# Patient Record
Sex: FEMALE | Race: OTHER | HISPANIC OR LATINO | URBAN - METROPOLITAN AREA
[De-identification: names, ages, dates, MRNs, and addresses within clinical notes are randomized per-mention and may not be internally consistent; named-entity substitution may affect disease eponyms.]

---

## 2022-04-25 ENCOUNTER — EMERGENCY (EMERGENCY)
Facility: HOSPITAL | Age: 57
LOS: 1 days | Discharge: HOME | End: 2022-04-25
Attending: STUDENT IN AN ORGANIZED HEALTH CARE EDUCATION/TRAINING PROGRAM
Payer: MEDICAID

## 2022-04-25 VITALS
HEART RATE: 75 BPM | RESPIRATION RATE: 18 BRPM | OXYGEN SATURATION: 97 % | TEMPERATURE: 98 F | DIASTOLIC BLOOD PRESSURE: 79 MMHG | SYSTOLIC BLOOD PRESSURE: 151 MMHG

## 2022-04-25 VITALS
WEIGHT: 159.84 LBS | HEART RATE: 82 BPM | DIASTOLIC BLOOD PRESSURE: 92 MMHG | OXYGEN SATURATION: 98 % | TEMPERATURE: 98 F | SYSTOLIC BLOOD PRESSURE: 172 MMHG | HEIGHT: 62 IN | RESPIRATION RATE: 18 BRPM

## 2022-04-25 LAB — HCG UR QL: NEGATIVE — SIGNIFICANT CHANGE UP

## 2022-04-25 PROCEDURE — 72128 CT CHEST SPINE W/O DYE: CPT | Mod: 26,MA

## 2022-04-25 PROCEDURE — 72131 CT LUMBAR SPINE W/O DYE: CPT | Mod: 26,MA

## 2022-04-25 PROCEDURE — 73080 X-RAY EXAM OF ELBOW: CPT | Mod: 26,LT

## 2022-04-25 PROCEDURE — 70450 CT HEAD/BRAIN W/O DYE: CPT | Mod: 26,MA

## 2022-04-25 PROCEDURE — 72128 CT CHEST SPINE W/O DYE: CPT | Mod: MA

## 2022-04-25 PROCEDURE — 72192 CT PELVIS W/O DYE: CPT | Mod: 26,MA

## 2022-04-25 PROCEDURE — 99285 EMERGENCY DEPT VISIT HI MDM: CPT

## 2022-04-25 PROCEDURE — 99284 EMERGENCY DEPT VISIT MOD MDM: CPT | Mod: 25

## 2022-04-25 PROCEDURE — 70450 CT HEAD/BRAIN W/O DYE: CPT | Mod: MA

## 2022-04-25 PROCEDURE — 72131 CT LUMBAR SPINE W/O DYE: CPT | Mod: MA

## 2022-04-25 PROCEDURE — 81025 URINE PREGNANCY TEST: CPT

## 2022-04-25 PROCEDURE — 72192 CT PELVIS W/O DYE: CPT | Mod: MA

## 2022-04-25 PROCEDURE — 73080 X-RAY EXAM OF ELBOW: CPT

## 2022-04-25 RX ORDER — OXYCODONE HYDROCHLORIDE 5 MG/1
1 TABLET ORAL
Qty: 16 | Refills: 0
Start: 2022-04-25 | End: 2022-04-28

## 2022-04-25 RX ORDER — OXYCODONE HYDROCHLORIDE 5 MG/1
5 TABLET ORAL ONCE
Refills: 0 | Status: DISCONTINUED | OUTPATIENT
Start: 2022-04-25 | End: 2022-04-25

## 2022-04-25 RX ORDER — OXYCODONE HYDROCHLORIDE 5 MG/1
5 TABLET ORAL ONCE
Refills: 0 | Status: COMPLETED | OUTPATIENT
Start: 2022-04-25 | End: 2022-04-25

## 2022-04-25 RX ORDER — ACETAMINOPHEN 500 MG
650 TABLET ORAL ONCE
Refills: 0 | Status: COMPLETED | OUTPATIENT
Start: 2022-04-25 | End: 2022-04-25

## 2022-04-25 RX ADMIN — OXYCODONE HYDROCHLORIDE 5 MILLIGRAM(S): 5 TABLET ORAL at 22:01

## 2022-04-25 RX ADMIN — Medication 650 MILLIGRAM(S): at 22:01

## 2022-04-25 NOTE — ED PROVIDER NOTE - PROGRESS NOTE DETAILS
Pt feeling well, ambulating without pain or limitation in ER, denies any numbness or weakness, no deficits on exam. Will f/u with spine. Strict return precautions given.

## 2022-04-25 NOTE — ED PROVIDER NOTE - OBJECTIVE STATEMENT
Patient is a 57 year old female with no significant PHMx presenting to the ED with complaints of back pain prior to fall. Patient states she slipped and fell 8 steps down a ladder, but caught herself on her way down with her left arm, which broke her fall. Patient additionally states she landed on her lower back, and hit the back of her head. Patient reports experiencing pain on her lower back but otherwise denies any loss of consciousness. NKDA. Patient is a 57 year old female with no significant PHMx presenting to the ED with complaints of back pain s/p fall. Patient states she slipped and fell 8 steps down a ladder, but caught herself on her way down with her left arm, which partly broke her fall. Patient then landed on her lower back and hit the back of her head but no LOC. NKDA.

## 2022-04-25 NOTE — ED PROVIDER NOTE - NSFOLLOWUPINSTRUCTIONS_ED_ALL_ED_FT
Follow up with your PCP in 24-48 hours.   Call a spine specialist to make a follow up appointment within 1 week.   May take oxycodone 5 mg every 6 hours as needed for severe pain.   May take Tylenol and Motrin as directed on the bottle for pain control.   Return to the ER if you develop any new or worsening symptoms such as worsening back pain, numbness, weakness, incontinence.

## 2022-04-25 NOTE — ED ADULT NURSE NOTE - NSIMPLEMENTINTERV_GEN_ALL_ED
Implemented All Fall Risk Interventions:  [place] to call system. Call bell, personal items and telephone within reach. Instruct patient to call for assistance. Room bathroom lighting operational. Non-slip footwear when patient is off stretcher. Physically safe environment: no spills, clutter or unnecessary equipment. Stretcher in lowest position, wheels locked, appropriate side rails in place. Provide visual cue, wrist band, yellow gown, etc. Monitor gait and stability. Monitor for mental status changes and reorient to person, place, and time. Review medications for side effects contributing to fall risk. Reinforce activity limits and safety measures with patient and family.

## 2022-04-25 NOTE — ED PROVIDER NOTE - MUSCULOSKELETAL, MLM
Spine appears normal. Full range of motion for all 4 extremities without pain or limitations. No CVA tenderness. Tenderness to left elbow. Tenderness to midline lumbar tenderness. No hip tenderness. No soft tissue swelling.

## 2022-04-25 NOTE — ED PROVIDER NOTE - PATIENT PORTAL LINK FT
You can access the FollowMyHealth Patient Portal offered by [facility] by registering at the following website: [email or web address]. By joining [employer]’s FollowMyHealth portal, you will also be able to view your health information using other applications (apps) compatible with our system.

## 2022-04-25 NOTE — ED PROVIDER NOTE - NS_EDPROVIDERDISPOUSERTYPE_ED_A_ED
Scribe Attestation (For Scribes USE Only)... 07-Jan-2018 13:28 Attending Attestation (For Attendings USE Only).../Scribe Attestation (For Scribes USE Only)...

## 2022-04-25 NOTE — ED ADULT NURSE NOTE - OBJECTIVE STATEMENT
Patient presented to the ED with S/P fall c/o hitting her head, pain lower back, buttocks and left arm.

## 2022-04-25 NOTE — ED PROVIDER NOTE - SKIN, MLM
Skin normal color for race, warm, dry and intact. No evidence of rash. Mild bruising to bilateral buttock.

## 2022-04-25 NOTE — ED PROVIDER NOTE - CLINICAL SUMMARY MEDICAL DECISION MAKING FREE TEXT BOX
Patient is a 57 year old female with no significant PHMx presenting to the ED with complaints of back pain prior to fall. Exam and history concerning for facture. Will reassess further for CT. Low suspicion for abdominal trauma based on exam and history. Will obtain pain meds and reassesses. Back pain, L elbow pain s/p fall off ladder. Did not fall 8 feet as in triage note. Broke fall half way down by grabbing ladder with left arm. Will assess for vertebral fracture with CT. Pt well appearing, sitting upright in chair, HD stable, low suspicion for serious thoracic or abdominal trauma based on exam and history.

## 2022-04-25 NOTE — ED ADULT TRIAGE NOTE - BP NONINVASIVE SYSTOLIC (MM HG)
Most likely benign and self resolving   Patient denies vision changes, conjunctival erythema, eye discharge, or headaches   Advised to continue monitoring symptoms    172

## 2022-04-25 NOTE — ED ADULT TRIAGE NOTE - CHIEF COMPLAINT QUOTE
sp fall off ladder about 10steps hit head, c/o pain lower back, buttocks and left arm. denied loss of consciousness, no nausea/vomiting.

## 2022-04-28 PROBLEM — Z00.00 ENCOUNTER FOR PREVENTIVE HEALTH EXAMINATION: Status: ACTIVE | Noted: 2022-04-28

## 2022-04-29 ENCOUNTER — APPOINTMENT (OUTPATIENT)
Dept: ORTHOPEDIC SURGERY | Facility: CLINIC | Age: 57
End: 2022-04-29
Payer: MEDICAID

## 2022-04-29 VITALS
BODY MASS INDEX: 29.06 KG/M2 | HEART RATE: 67 BPM | WEIGHT: 148 LBS | HEIGHT: 60 IN | OXYGEN SATURATION: 96 % | SYSTOLIC BLOOD PRESSURE: 115 MMHG | DIASTOLIC BLOOD PRESSURE: 72 MMHG

## 2022-04-29 DIAGNOSIS — S32.009A UNSPECIFIED FRACTURE OF UNSPECIFIED LUMBAR VERTEBRA, INITIAL ENCOUNTER FOR CLOSED FRACTURE: ICD-10-CM

## 2022-04-29 DIAGNOSIS — S22.009A UNSPECIFIED FRACTURE OF UNSPECIFIED THORACIC VERTEBRA, INITIAL ENCOUNTER FOR CLOSED FRACTURE: ICD-10-CM

## 2022-04-29 PROCEDURE — 99203 OFFICE O/P NEW LOW 30 MIN: CPT

## 2022-04-29 NOTE — HISTORY OF PRESENT ILLNESS
[de-identified] : Ms. MALLORY MORRIS  is a 57 year old female who presents with lower thoracic and left lumbar pain since she fell on Monday and landed on a ladder.  She went to the ER and had studies done.  She is slowly feeling better.  She is taking oxycodone for pain control.  Denies any LE radicular symptoms.  Normal bowel and bladder control.   Denies any recent fevers, chills, sweats, weight loss, or infection.\par \par The patients past medical history, past surgical history, medications, allergies, and social history were reviewed by me today with the patient and documented accordingly.  In addition, the patient's family history, which is noncontributory to their visit, was also reviewed.\par

## 2022-04-29 NOTE — DISCUSSION/SUMMARY
[de-identified] : We discussed further treatment options.  Overall she is slowly improving.  Restrictions again were reviewed.  She will continue with over-the-counter medications.  Follow-up afterwards or sooner with any changes or worsening of her symptoms.

## 2022-04-29 NOTE — PHYSICAL EXAM
[Antalgic] : not antalgic [Ataxic] : not ataxic [de-identified] : Examination of the thoracic and lumbar spine reveals no midline tenderness palpation, step-offs, or skin lesions. Decreased range of motion with respect to flexion, extension, lateral bending, and rotation. No tenderness to palpation of the sciatic notch. No tenderness palpation of the bilateral greater trochanters. No pain with passive internal/external rotation of the hips. No instability of bilateral lower extremities.  Negative MONIQUE. Negative straight leg raise bilaterally. No bowstring. Negative femoral stretch. 5 out of 5 iliopsoas, hip abductors, hips adductors, quadriceps, hamstrings, gastrocsoleus, tibialis anterior, extensor hallucis longus, peroneals. Grossly intact sensation to light touch bilateral lower extremities. 1+ patellar and Achilles reflexes. Downgoing Babinski. No clonus. Intact proprioception. Palpable pulses. No skin lesion and no edema on the right and left lower extremities. [de-identified] : Review of her CT scans of her thoracic and lumbar spine does reveal questionable T4 compression fracture.  Minimally displaced left L1 and L2 transverse process fractures.

## 2022-11-16 ENCOUNTER — EMERGENCY (EMERGENCY)
Facility: HOSPITAL | Age: 57
LOS: 1 days | Discharge: HOME | End: 2022-11-16
Attending: STUDENT IN AN ORGANIZED HEALTH CARE EDUCATION/TRAINING PROGRAM
Payer: MEDICAID

## 2022-11-16 VITALS
OXYGEN SATURATION: 95 % | DIASTOLIC BLOOD PRESSURE: 76 MMHG | HEART RATE: 64 BPM | WEIGHT: 145.06 LBS | RESPIRATION RATE: 18 BRPM | TEMPERATURE: 98 F | SYSTOLIC BLOOD PRESSURE: 148 MMHG

## 2022-11-16 PROCEDURE — 99283 EMERGENCY DEPT VISIT LOW MDM: CPT | Mod: 25

## 2022-11-16 PROCEDURE — 96372 THER/PROPH/DIAG INJ SC/IM: CPT

## 2022-11-16 PROCEDURE — 99284 EMERGENCY DEPT VISIT MOD MDM: CPT

## 2022-11-16 RX ORDER — IBUPROFEN 200 MG
1 TABLET ORAL
Qty: 20 | Refills: 0
Start: 2022-11-16 | End: 2022-11-20

## 2022-11-16 RX ORDER — METHOCARBAMOL 500 MG/1
2 TABLET, FILM COATED ORAL
Qty: 18 | Refills: 0
Start: 2022-11-16 | End: 2022-11-18

## 2022-11-16 RX ORDER — KETOROLAC TROMETHAMINE 30 MG/ML
30 SYRINGE (ML) INJECTION ONCE
Refills: 0 | Status: DISCONTINUED | OUTPATIENT
Start: 2022-11-16 | End: 2022-11-16

## 2022-11-16 RX ORDER — METHOCARBAMOL 500 MG/1
1500 TABLET, FILM COATED ORAL ONCE
Refills: 0 | Status: COMPLETED | OUTPATIENT
Start: 2022-11-16 | End: 2022-11-16

## 2022-11-16 RX ORDER — LIDOCAINE 4 G/100G
1 CREAM TOPICAL
Qty: 7 | Refills: 0
Start: 2022-11-16 | End: 2022-11-22

## 2022-11-16 RX ORDER — LIDOCAINE 4 G/100G
1 CREAM TOPICAL ONCE
Refills: 0 | Status: COMPLETED | OUTPATIENT
Start: 2022-11-16 | End: 2022-11-16

## 2022-11-16 RX ADMIN — METHOCARBAMOL 1500 MILLIGRAM(S): 500 TABLET, FILM COATED ORAL at 12:54

## 2022-11-16 RX ADMIN — LIDOCAINE 1 PATCH: 4 CREAM TOPICAL at 12:55

## 2022-11-16 RX ADMIN — Medication 30 MILLIGRAM(S): at 12:54

## 2022-11-16 NOTE — ED PROVIDER NOTE - CLINICAL SUMMARY MEDICAL DECISION MAKING FREE TEXT BOX
57 year old female with past medical history of hypertension presents to the ED with complaints of right-sided lower back pain radiating down to the right leg for 2 days. No concerning features. Will give the patient pain medications and reassess.

## 2022-11-16 NOTE — ED PROVIDER NOTE - PATIENT PORTAL LINK FT
You can access the FollowMyHealth Patient Portal offered by [facility] by registering at the following website: [email or web address]. By joining [employer]’s FollowMyHealth portal, you will also be able to view your health information using other applications (apps) compatible with our system.

## 2022-11-16 NOTE — ED PROVIDER NOTE - CHPI ED SYMPTOMS NEG
no weakness, no saddle anesthesia, no fever, no chills,/no bladder dysfunction/no bowel dysfunction/no numbness/no tingling

## 2022-11-16 NOTE — ED PROVIDER NOTE - NSFOLLOWUPINSTRUCTIONS_ED_ALL_ED_FT
Tamy un seguimiento con lawson médico de atención primaria dentro de 1 semana.    Medicamentos enviados a la farmacia por usted.    Si experimenta síntomas nuevos o que empeoran, o si está preocupado, siempre puede regresar a la emergencia para ishan reevaluación.    Follow up with your primary care doctor within 1 week.     Medications sent to the pharmacy for you.     If you experience any new or worsening symptoms or if you are concerned you can always come back to the emergency for a re-evaluation.     Dolor de espalda    El dolor de espalda puede debbie miedo. Juliane incluso cuando el dolor es intenso, por lo general desaparece por sí solo en unas pocas semanas. Los casos que requieren atención urgente o cirugía son raros. No asumas lo peor. Ashley todo el chris tiene dolor de espalda en algún momento.    Consulte a lawson médico o enfermera si tiene dolor de espalda y usted:    -Recientemente tuvo ishan caída o ishan lesión en la espalda    -Tiene entumecimiento o debilidad en las piernas    -Tiene problemas con el control de la vejiga o el intestino    -Tiene pérdida de peso inexplicable    -Tener fiebre o sentirse enfermo de otras maneras    -Dorothy medicamentos esteroides, leora prednisona, de forma regular.    -Tiene diabetes o un problema médico que debilita lawson sistema inmunológico    -Tener antecedentes de cáncer u osteoporosis.    También debe consultar a un médico si:    -Lawson dolor de espalda es tan severo que no puede realizar tareas simples    -Lawson dolor de espalda no comienza a mejorar en 4 semanas    Muchas cosas diferentes pueden causar dolor lumbar. La mayoría de las veces, los médicos no conocen la causa exacta.    El dolor de espalda puede ocurrir si se esfuerza un músculo. [place] es a menudo lo que estrada sucedido cuando ishan persona "se cinda" la espalda. [place] se refiere al dolor que comienza repentinamente después de la actividad física, leora levantar algo pesado o doblar la espalda.    El dolor de espalda también puede ocurrir si tiene:    -Discos dañados, abultados o rotos    -Artritis que afecta las articulaciones de la columna vertebral    - Crecimientos óseos en las vértebras que aprietan los nervios cercanos    -Ishan vértebra fuera de lugar    -Estrechamiento en el canal ugalde    -Un tumor o infección (juliane esto es muy raro)    La mayoría de las personas con un episodio de dolor lumbar no tienen un problema médico grave y pueden probar tratamientos simples leora:    -Mantenerse activo: lo mejor que puede hacer es mantenerse lo más activo posible. Las personas con dolor lumbar se recuperan más rápido si se mantienen activas. Si lawson dolor es intenso, es posible que deba descansar skylar cirilo o dos días. Juliane es importante volver a caminar y moverse lo antes posible. Si crystal debe evitar el levantamiento de objetos pesados ??y los deportes mientras le duele la espalda, intente continuar con jabier actividades diarias normales.    -Calor: a algunas personas les resulta útil usar ishan almohadilla térmica o ishan envoltura térmica. Tenga cuidado de evitar los ajustes de calor alto para evitar quemaduras en la piel.    -Medicamentos: brooke, puede probar analgésicos que puede obtener sin receta médica. En muchos casos, los médicos sugieren probar brooke un medicamento antiinflamatorio no esteroideo o "CARLOS EDUARDO". Los CARLOS EDUARDO incluyen ibuprofeno (marcas de muestra: Advil, Motrin) y naproxeno (marcas de muestra: Aleve). Estos podrían funcionar mejor que el paracetamol (Tylenol) para el dolor de espalda.    -Tratamientos para ayudar con los síntomas: algunos tratamientos pueden ayudarlo a sentirse mejor por un tiempo. Incluyen:    -Manipulación de la columna: esto es cuando un quiropráctico, fisioterapeuta u otro profesional mueve o "ajusta" las articulaciones de la espalda. Si quiere probar esto, hable brooke con lawson médico o enfermera.    -Acupuntura: esto es cuando alguien que conoce la medicina tradicional china inserta pequeñas agujas en lawson cuerpo para bloquear las señales de dolor.    -Masaje    Si crystal el dolor de espalda generalmente desaparece en unas pocas semanas, algunas personas continúan teniendo dolor por más tiempo. En nate abraham, los tratamientos adicionales pueden incluir:    -Cuidado propio: esto implica ser consciente de lawson dolor. Si crystal debe descansar cuando lo necesite, es importante mantenerse activo tanto leora pueda. Cosas leora aplicar calor y hacer estiramientos suaves también pueden ayudarlo a sentirse mejor.    -Fisioterapia: un fisioterapeuta es un experto en ejercicios que puede enseñarle estiramientos y movimientos para ayudarlo a fortalecer jabier músculos. El objetivo es aliviar el dolor juliane también ayudarlo a volver a jabier actividades normales. Los ejercicios que puede probar incluyen caminar, nadar o usar ishan bicicleta estática. Algunas personas también encuentran que el [place] o el yoga pueden ayudar con el dolor de espalda. Encontrar actividades que disfrute puede ayudarlo a mantenerse activo.    -Reducir el estrés: a algunas personas les resulta útil probar algo llamado "reducción del estrés basada en la atención plena". [place] implica asistir a un programa grupal para practicar la relajación y la meditación. Si lawson dolor de espalda lo hace sentir ansioso o deprimido, hable con lawson médico o enfermera. Existen otros tratamientos que pueden ayudar con estos problemas.

## 2022-11-16 NOTE — ED PROVIDER NOTE - OBJECTIVE STATEMENT
57 year old female with past medical history of hypertension presents to the ED with complaints of right-sided lower back pain radiating down to the right leg for 2 days. The patient additionally reports headache, generalized fatigue. She reports that her symptoms started after cleaning her yard. The patient denies heavy lifting but does report bending over frequently. Denies LE numbness, tingling, weakness, saddle anesthesia, fever, chills, IVDU, use of medications for her pain, hx of cancer, bowel or bladder incontinence or any other concerning features. NKDA.

## 2022-11-16 NOTE — ED ADULT NURSE NOTE - OBJECTIVE STATEMENT
58 yo female sitting on a chair c/o lower back pain radiating to right leg that started 2 days ago. Patient endorses headache and fatigue.

## 2022-11-16 NOTE — ED PROVIDER NOTE - NS_ACPWITHSCRIBE_ED_ALL_ED
General I personally performed the service described in the documentation  recorded by the scribe in my presence, and it accurately and completely records my words and action.

## 2022-11-16 NOTE — ED PROVIDER NOTE - PROGRESS NOTE DETAILS
Patient reports she is feeling better. Will have patient follow up with PCP. Pt is well appearing walking with steady gait, stable for discharge and follow up without fail with medical doctor. I had a detailed discussion with the patient and/or guardian regarding the historical points, exam findings, and any diagnostic results supporting the discharge diagnosis. Pt educated on care and need for follow up. Strict return instructions and red flag signs and symptoms discussed with patient. Questions answered. Pt shows understanding of discharge information and agrees to follow.

## 2022-11-18 ENCOUNTER — EMERGENCY (EMERGENCY)
Facility: HOSPITAL | Age: 57
LOS: 1 days | Discharge: HOME | End: 2022-11-18
Attending: EMERGENCY MEDICINE
Payer: MEDICAID

## 2022-11-18 VITALS
TEMPERATURE: 98 F | HEIGHT: 60.63 IN | RESPIRATION RATE: 16 BRPM | SYSTOLIC BLOOD PRESSURE: 182 MMHG | DIASTOLIC BLOOD PRESSURE: 84 MMHG | HEART RATE: 66 BPM | WEIGHT: 147.71 LBS | OXYGEN SATURATION: 98 %

## 2022-11-18 PROCEDURE — 99285 EMERGENCY DEPT VISIT HI MDM: CPT

## 2022-11-18 RX ORDER — ONDANSETRON 8 MG/1
4 TABLET, FILM COATED ORAL ONCE
Refills: 0 | Status: COMPLETED | OUTPATIENT
Start: 2022-11-18 | End: 2022-11-18

## 2022-11-18 RX ORDER — SODIUM CHLORIDE 9 MG/ML
1000 INJECTION INTRAMUSCULAR; INTRAVENOUS; SUBCUTANEOUS ONCE
Refills: 0 | Status: COMPLETED | OUTPATIENT
Start: 2022-11-18 | End: 2022-11-18

## 2022-11-18 RX ORDER — FAMOTIDINE 10 MG/ML
20 INJECTION INTRAVENOUS ONCE
Refills: 0 | Status: COMPLETED | OUTPATIENT
Start: 2022-11-18 | End: 2022-11-18

## 2022-11-18 RX ORDER — MORPHINE SULFATE 50 MG/1
2 CAPSULE, EXTENDED RELEASE ORAL ONCE
Refills: 0 | Status: DISCONTINUED | OUTPATIENT
Start: 2022-11-18 | End: 2022-11-18

## 2022-11-18 NOTE — ED PROVIDER NOTE - OBJECTIVE STATEMENT
57 year old female PMh HTN coming in with 5 days of right sided abdominal pain that hasn't improved with associated nausea. states never had symptoms like this before. states also with occasional pain radiating down her LLE. denies all other complaints.

## 2022-11-18 NOTE — ED PROVIDER NOTE - PROGRESS NOTE DETAILS
symptoms resolved. labs are unremarkable. ct abdo/pelvis no acute abnormality. symptoms likely 2/2 gastritis. will dc. f/u PMD. return precautions discussed.

## 2022-11-18 NOTE — ED PROVIDER NOTE - PATIENT PORTAL LINK FT
You can access the FollowMyHealth Patient Portal offered by [facility] by registering at the following website: [email or web address]. By joining [employer]s FollowMyHealth portal, you will also be able to view your health information using other applications (apps) compatible with our system.

## 2022-11-18 NOTE — ED PROVIDER NOTE - NSFOLLOWUPINSTRUCTIONS_ED_ALL_ED_FT
[facility]                                                                                                                                 Gastritis en adultos    Gastritis, Adult    Outline of an adult's lower body with a close-up of the stomach, showing inflammation and an ulcer inside the stomach.    La gastritis es la inflamación del estómago. Hay dos tipos de gastritis:  •Gastritis aguda. Anne Marie tipo aparece de manera repentina.      •Gastritis crónica. Anne Marie tipo es mucho más frecuente. Se desarrolla lentamente y dura un mary tiempo.      La gastritis se manifiesta cuando el revestimiento del estómago se debilita o se lesiona. Sin tratamiento, la gastritis puede causar sangrado y úlceras estomacales.      ¿Cuáles son las causas?    Esta afección puede ser causada por lo siguiente:  •Ishan infección.      •Beber alcohol en exceso.      •Ciertos medicamentos. Estos incluyen corticoesteroides, antibióticos y algunos medicamentos de venta sin receta, leora aspirina o ibuprofeno.      •Tener demasiado ácido en el estómago.      •Tener ishan enfermedad del estómago.      Algunas otras causas son las siguientes:  •Ishan reacción alérgica.      •Algunos tratamientos para el cáncer (radiación).      •Fumar cigarrillos o usar productos que contengan nicotina o tabaco.      En algunos casos, se desconoce la causa de esta afección.      ¿Qué incrementa el riesgo?    •Tener ishan enfermedad de los intestinos.      •Tener ishan enfermedad por la cual el propio sistema inmunitario ataca el organismo (enfermedad autoinmunitaria), leora la enfermedad de Crohn.      •Usar aspirina o ibuprofeno y otros antiinflamatorios no esteroideos (CARLOS EDUARDO) para tratar otras afecciones, leora enfermedades cardíacas o dolor crónico.      •Estrés.        ¿Cuáles son los signos o síntomas?    Los síntomas de esta afección incluyen los siguientes:  •Dolor o sensación de ardor en la parte superior del abdomen.      •Náuseas.      •Vómitos.      •Sensación molesta de saciedad después de comer.      •Pérdida de peso.      •Mal aliento.      •Cooper en el vómito o en la materia fecal (heces).      En algunos casos, no hay síntomas.      ¿Cómo se diagnostica?    Esta afección se puede diagnosticar en función de magali antecedentes médicos, un examen físico y pruebas. Las pruebas pueden incluir las siguientes:  •Magali antecedentes médicos y ishan descripción de magali síntomas.      •Un examen físico.    •Pruebas. Estas pueden incluir las siguientes:  •Pruebas de cooper.      •Pruebas de heces.      •Ishan prueba en la que se introduce un instrumento machado y flexible con ishan sarah y ishan pequeña cámara a través del esófago hasta el estómago (endoscopía deni).      •Ishan prueba en la que se justus ishan muestra de tejido para analizarla con un microscopio (biopsia).          ¿Cómo se trata?    Esta afección se puede tratar con medicamentos. Los medicamentos que se utilizan varían según la causa de la gastritis.  •Si la afección se debe a ishan infección bacteriana, pueden recetarle medicamentos antibióticos.      •Si la afección se debe al exceso de ácido estomacal, se pueden administrar medicamentos denominados bloqueadores H2, inhibidores de la bomba de protones o antiácidos.      El tratamiento también puede incluir interrumpir el uso de ciertos medicamentos leora aspirina o ibuprofeno y otros antiinflamatorios no esteroideos (CARLOS EDUARDO).      Siga estas instrucciones en lawson casa:    Medicamentos     •Use los medicamentos de venta kath y los recetados solamente leora se lo haya indicado el médico.      •Si le recetaron un antibiótico, tómelo leora se lo haya indicado el médico. No deje de zaida el antibiótico aunque comience a sentirse mejor.      Consumo de alcohol   • No shira alcohol si:  •Lawson médico le indica no hacerlo.      •Está embarazada, puede estar embarazada o está tratando de quedar embarazada.      •Si jorge luis alcohol:•Limite lawson uso a las siguientes medidas:  •De 0 a 1 medida por día para las mujeres.      •De 0 a 2 medidas por día para los hombres.        •Sepa cuánta cantidad de alcohol hay en las bebidas que justus. En los [place], ishan medida equivale a ishan botella de cerveza de 12 oz (355 ml), un vaso de vino de 5 oz (148 ml) o un vaso de ishan bebida alcohólica de deni graduación de 1½ oz (44 ml).          Instrucciones generales   A comparison of three sample cups showing dark yellow, yellow, and pale yellow urine.   •Tamy comidas pequeñas y frecuentes skylar el día en lugar de comidas abundantes.      •Evite los alimentos y las bebidas que intensifican los síntomas.      •Hable con el médico sobre las formas de controlar el estrés, leora realizar ejercicio con regularidad o practicar respiración profunda, meditación o yoga.      • No consuma ningún producto que contenga nicotina o tabaco. Estos productos incluyen cigarrillos, tabaco para mascar y aparatos de vapeo, leora los cigarrillos electrónicos. Si necesita ayuda para dejar de fumar, consulte al médico.      •Shira suficiente líquido leora para mantener la orina de color amarillo pálido.      •Concurra a todas las visitas de seguimiento. [place] es importante.        Comuníquese con un médico si:    •Magali síntomas empeoran.      •El dolor abdominal empeora.      •Los síntomas regresan después del tratamiento.      •Tiene fiebre.        Solicite ayuda de inmediato si:    •Vomita cooper o ishan sustancia parecida a los posos del café.      •Las heces son negras o de color conn oscuro.      •No puede retener los líquidos.      Estos síntomas pueden representar un problema grave que constituye ishan emergencia. No espere a selene si los síntomas desaparecen. Solicite atención médica de inmediato. Comuníquese con el servicio de emergencias de lawson localidad (911 en los [place]). No conduzca por magali propios medios hasta el hospital.       Resumen    •La gastritis es la inflamación del revestimiento del estómago que puede ocurrir de manera repentina (aguda) o desarrollarse lentamente con el tiempo (crónica).      •Esta afección se diagnostica mediante los antecedentes médicos, un examen físico o pruebas.      •Esta afección puede tratarse con medicamentos para tratar la infección o medicamentos para reducir la cantidad de ácido en el estómago.      •Siga las instrucciones del médico acerca de zaida medicamentos, hacer cambios en la dieta y saber cuándo pedir ayuda.      Esta información no tiene leora fin reemplazar el consejo del médico. Asegúrese de hacerle al médico cualquier pregunta que tenga.      Document Revised: 05/19/2022 Document Reviewed: 05/19/2022    Elsevier Patient Education © 2022 Else[employer] Inc.

## 2022-11-18 NOTE — ED PROVIDER NOTE - NSDCPRINTRESULTS_ED_ALL_ED
Pt completed stress portion of cardiac stress test. Pt is discharged to nuclear department for final scan. PIV removed. Discharge instructions given to pt. Pt verbalizes understanding to discharge instructions. Patient requests all Lab, Cardiology, and Radiology Results on their Discharge Instructions

## 2022-11-18 NOTE — ED ADULT TRIAGE NOTE - BP NONINVASIVE SYSTOLIC (MM HG)
182 I personally performed the service described in the documentation recorded by the scribe in my presence, and it accurately and completely records my words and actions.

## 2022-11-18 NOTE — ED PROVIDER NOTE - CLINICAL SUMMARY MEDICAL DECISION MAKING FREE TEXT BOX
57 year old female with right sided abd pain. PE as above.  labs, pain control, gi cocktail, ct abdo/pelvis, reassess

## 2022-11-19 VITALS
OXYGEN SATURATION: 99 % | DIASTOLIC BLOOD PRESSURE: 70 MMHG | TEMPERATURE: 98 F | HEART RATE: 60 BPM | SYSTOLIC BLOOD PRESSURE: 150 MMHG | RESPIRATION RATE: 16 BRPM

## 2022-11-19 PROBLEM — I10 ESSENTIAL (PRIMARY) HYPERTENSION: Chronic | Status: ACTIVE | Noted: 2022-11-16

## 2022-11-19 LAB
ALBUMIN SERPL ELPH-MCNC: 4.4 G/DL — SIGNIFICANT CHANGE UP (ref 3.5–5)
ALP SERPL-CCNC: 78 U/L — SIGNIFICANT CHANGE UP (ref 40–120)
ALT FLD-CCNC: 35 U/L DA — SIGNIFICANT CHANGE UP (ref 10–60)
ANION GAP SERPL CALC-SCNC: 10 MMOL/L — SIGNIFICANT CHANGE UP (ref 5–17)
APPEARANCE UR: CLEAR — SIGNIFICANT CHANGE UP
AST SERPL-CCNC: 27 U/L — SIGNIFICANT CHANGE UP (ref 10–40)
BASOPHILS # BLD AUTO: 0.03 K/UL — SIGNIFICANT CHANGE UP (ref 0–0.2)
BASOPHILS NFR BLD AUTO: 0.4 % — SIGNIFICANT CHANGE UP (ref 0–2)
BILIRUB SERPL-MCNC: 0.7 MG/DL — SIGNIFICANT CHANGE UP (ref 0.2–1.2)
BILIRUB UR-MCNC: NEGATIVE — SIGNIFICANT CHANGE UP
BUN SERPL-MCNC: 26 MG/DL — HIGH (ref 7–18)
CALCIUM SERPL-MCNC: 9.2 MG/DL — SIGNIFICANT CHANGE UP (ref 8.4–10.5)
CHLORIDE SERPL-SCNC: 101 MMOL/L — SIGNIFICANT CHANGE UP (ref 96–108)
CO2 SERPL-SCNC: 26 MMOL/L — SIGNIFICANT CHANGE UP (ref 22–31)
COLOR SPEC: YELLOW — SIGNIFICANT CHANGE UP
CREAT SERPL-MCNC: 1.22 MG/DL — SIGNIFICANT CHANGE UP (ref 0.5–1.3)
DIFF PNL FLD: ABNORMAL
EGFR: 52 ML/MIN/1.73M2 — LOW
EOSINOPHIL # BLD AUTO: 0.07 K/UL — SIGNIFICANT CHANGE UP (ref 0–0.5)
EOSINOPHIL NFR BLD AUTO: 0.9 % — SIGNIFICANT CHANGE UP (ref 0–6)
GLUCOSE SERPL-MCNC: 111 MG/DL — HIGH (ref 70–99)
GLUCOSE UR QL: NEGATIVE — SIGNIFICANT CHANGE UP
HCG SERPL-ACNC: 3 MIU/ML — SIGNIFICANT CHANGE UP
HCT VFR BLD CALC: 40.5 % — SIGNIFICANT CHANGE UP (ref 34.5–45)
HGB BLD-MCNC: 13.9 G/DL — SIGNIFICANT CHANGE UP (ref 11.5–15.5)
IMM GRANULOCYTES NFR BLD AUTO: 0.1 % — SIGNIFICANT CHANGE UP (ref 0–0.9)
KETONES UR-MCNC: NEGATIVE — SIGNIFICANT CHANGE UP
LEUKOCYTE ESTERASE UR-ACNC: NEGATIVE — SIGNIFICANT CHANGE UP
LIDOCAIN IGE QN: 239 U/L — SIGNIFICANT CHANGE UP (ref 73–393)
LYMPHOCYTES # BLD AUTO: 2.78 K/UL — SIGNIFICANT CHANGE UP (ref 1–3.3)
LYMPHOCYTES # BLD AUTO: 35.1 % — SIGNIFICANT CHANGE UP (ref 13–44)
MCHC RBC-ENTMCNC: 28.7 PG — SIGNIFICANT CHANGE UP (ref 27–34)
MCHC RBC-ENTMCNC: 34.3 GM/DL — SIGNIFICANT CHANGE UP (ref 32–36)
MCV RBC AUTO: 83.5 FL — SIGNIFICANT CHANGE UP (ref 80–100)
MONOCYTES # BLD AUTO: 0.71 K/UL — SIGNIFICANT CHANGE UP (ref 0–0.9)
MONOCYTES NFR BLD AUTO: 9 % — SIGNIFICANT CHANGE UP (ref 2–14)
NEUTROPHILS # BLD AUTO: 4.32 K/UL — SIGNIFICANT CHANGE UP (ref 1.8–7.4)
NEUTROPHILS NFR BLD AUTO: 54.5 % — SIGNIFICANT CHANGE UP (ref 43–77)
NITRITE UR-MCNC: NEGATIVE — SIGNIFICANT CHANGE UP
NRBC # BLD: 0 /100 WBCS — SIGNIFICANT CHANGE UP (ref 0–0)
PH UR: 6.5 — SIGNIFICANT CHANGE UP (ref 5–8)
PLATELET # BLD AUTO: 319 K/UL — SIGNIFICANT CHANGE UP (ref 150–400)
POTASSIUM SERPL-MCNC: 3.4 MMOL/L — LOW (ref 3.5–5.3)
POTASSIUM SERPL-SCNC: 3.4 MMOL/L — LOW (ref 3.5–5.3)
PROT SERPL-MCNC: 7.7 G/DL — SIGNIFICANT CHANGE UP (ref 6–8.3)
PROT UR-MCNC: NEGATIVE — SIGNIFICANT CHANGE UP
RBC # BLD: 4.85 M/UL — SIGNIFICANT CHANGE UP (ref 3.8–5.2)
RBC # FLD: 14.2 % — SIGNIFICANT CHANGE UP (ref 10.3–14.5)
SODIUM SERPL-SCNC: 137 MMOL/L — SIGNIFICANT CHANGE UP (ref 135–145)
SP GR SPEC: 1 — LOW (ref 1.01–1.02)
TROPONIN I, HIGH SENSITIVITY RESULT: 36.9 NG/L — SIGNIFICANT CHANGE UP
UROBILINOGEN FLD QL: NEGATIVE — SIGNIFICANT CHANGE UP
WBC # BLD: 7.92 K/UL — SIGNIFICANT CHANGE UP (ref 3.8–10.5)
WBC # FLD AUTO: 7.92 K/UL — SIGNIFICANT CHANGE UP (ref 3.8–10.5)

## 2022-11-19 PROCEDURE — 84484 ASSAY OF TROPONIN QUANT: CPT

## 2022-11-19 PROCEDURE — 96375 TX/PRO/DX INJ NEW DRUG ADDON: CPT

## 2022-11-19 PROCEDURE — 80053 COMPREHEN METABOLIC PANEL: CPT

## 2022-11-19 PROCEDURE — 81001 URINALYSIS AUTO W/SCOPE: CPT

## 2022-11-19 PROCEDURE — 74177 CT ABD & PELVIS W/CONTRAST: CPT | Mod: MA

## 2022-11-19 PROCEDURE — 74177 CT ABD & PELVIS W/CONTRAST: CPT | Mod: 26,MA

## 2022-11-19 PROCEDURE — 84702 CHORIONIC GONADOTROPIN TEST: CPT

## 2022-11-19 PROCEDURE — 36415 COLL VENOUS BLD VENIPUNCTURE: CPT

## 2022-11-19 PROCEDURE — 87086 URINE CULTURE/COLONY COUNT: CPT

## 2022-11-19 PROCEDURE — 83690 ASSAY OF LIPASE: CPT

## 2022-11-19 PROCEDURE — 99284 EMERGENCY DEPT VISIT MOD MDM: CPT | Mod: 25

## 2022-11-19 PROCEDURE — 96374 THER/PROPH/DIAG INJ IV PUSH: CPT | Mod: XU

## 2022-11-19 PROCEDURE — 85025 COMPLETE CBC W/AUTO DIFF WBC: CPT

## 2022-11-19 RX ORDER — ESOMEPRAZOLE MAGNESIUM 40 MG/1
1 CAPSULE, DELAYED RELEASE ORAL
Qty: 14 | Refills: 0
Start: 2022-11-19 | End: 2022-12-02

## 2022-11-19 RX ADMIN — Medication 30 MILLILITER(S): at 00:08

## 2022-11-19 RX ADMIN — MORPHINE SULFATE 2 MILLIGRAM(S): 50 CAPSULE, EXTENDED RELEASE ORAL at 00:08

## 2022-11-19 RX ADMIN — FAMOTIDINE 20 MILLIGRAM(S): 10 INJECTION INTRAVENOUS at 00:16

## 2022-11-19 RX ADMIN — ONDANSETRON 4 MILLIGRAM(S): 8 TABLET, FILM COATED ORAL at 00:08

## 2022-11-19 RX ADMIN — SODIUM CHLORIDE 1000 MILLILITER(S): 9 INJECTION INTRAMUSCULAR; INTRAVENOUS; SUBCUTANEOUS at 00:08

## 2022-11-20 LAB
CULTURE RESULTS: SIGNIFICANT CHANGE UP
SPECIMEN SOURCE: SIGNIFICANT CHANGE UP

## 2025-03-29 ENCOUNTER — EMERGENCY (EMERGENCY)
Facility: HOSPITAL | Age: 60
LOS: 1 days | Discharge: HOME | End: 2025-03-29
Attending: EMERGENCY MEDICINE
Payer: MEDICAID

## 2025-03-29 VITALS
TEMPERATURE: 98 F | SYSTOLIC BLOOD PRESSURE: 189 MMHG | WEIGHT: 161.38 LBS | RESPIRATION RATE: 16 BRPM | HEART RATE: 71 BPM | DIASTOLIC BLOOD PRESSURE: 97 MMHG | OXYGEN SATURATION: 98 %

## 2025-03-29 LAB
ALP SERPL-CCNC: 97 U/L — SIGNIFICANT CHANGE UP (ref 40–120)
ALT FLD-CCNC: 31 U/L DA — SIGNIFICANT CHANGE UP (ref 10–60)
ANION GAP SERPL CALC-SCNC: 6 MMOL/L — SIGNIFICANT CHANGE UP (ref 5–17)
APPEARANCE UR: CLEAR — SIGNIFICANT CHANGE UP
AST SERPL-CCNC: 20 U/L — SIGNIFICANT CHANGE UP (ref 10–40)
BACTERIA # UR AUTO: ABNORMAL /HPF
BASOPHILS # BLD AUTO: 0.02 K/UL — SIGNIFICANT CHANGE UP (ref 0–0.2)
BASOPHILS NFR BLD AUTO: 0.3 % — SIGNIFICANT CHANGE UP (ref 0–2)
BILIRUB SERPL-MCNC: 0.3 MG/DL — SIGNIFICANT CHANGE UP (ref 0.2–1.2)
BILIRUB UR-MCNC: NEGATIVE — SIGNIFICANT CHANGE UP
BUN SERPL-MCNC: 22 MG/DL — HIGH (ref 7–18)
CALCIUM SERPL-MCNC: 8.9 MG/DL — SIGNIFICANT CHANGE UP (ref 8.4–10.5)
CHLORIDE SERPL-SCNC: 112 MMOL/L — HIGH (ref 96–108)
CO2 SERPL-SCNC: 24 MMOL/L — SIGNIFICANT CHANGE UP (ref 22–31)
CREAT SERPL-MCNC: 0.85 MG/DL — SIGNIFICANT CHANGE UP (ref 0.5–1.3)
DIFF PNL FLD: ABNORMAL
EGFR: 79 ML/MIN/1.73M2 — SIGNIFICANT CHANGE UP
EGFR: 79 ML/MIN/1.73M2 — SIGNIFICANT CHANGE UP
EOSINOPHIL # BLD AUTO: 0.11 K/UL — SIGNIFICANT CHANGE UP (ref 0–0.5)
EOSINOPHIL NFR BLD AUTO: 1.7 % — SIGNIFICANT CHANGE UP (ref 0–6)
GLUCOSE SERPL-MCNC: 120 MG/DL — HIGH (ref 70–99)
GLUCOSE UR QL: NEGATIVE MG/DL — SIGNIFICANT CHANGE UP
HCT VFR BLD CALC: 40 % — SIGNIFICANT CHANGE UP (ref 34.5–45)
HGB BLD-MCNC: 13.1 G/DL — SIGNIFICANT CHANGE UP (ref 11.5–15.5)
IMM GRANULOCYTES NFR BLD AUTO: 0.2 % — SIGNIFICANT CHANGE UP (ref 0–0.9)
KETONES UR-MCNC: NEGATIVE MG/DL — SIGNIFICANT CHANGE UP
LEUKOCYTE ESTERASE UR-ACNC: NEGATIVE — SIGNIFICANT CHANGE UP
LIDOCAIN IGE QN: 89 U/L — HIGH (ref 13–75)
LYMPHOCYTES # BLD AUTO: 2.96 K/UL — SIGNIFICANT CHANGE UP (ref 1–3.3)
LYMPHOCYTES # BLD AUTO: 44.7 % — HIGH (ref 13–44)
MCHC RBC-ENTMCNC: 28 PG — SIGNIFICANT CHANGE UP (ref 27–34)
MCHC RBC-ENTMCNC: 32.8 G/DL — SIGNIFICANT CHANGE UP (ref 32–36)
MCV RBC AUTO: 85.5 FL — SIGNIFICANT CHANGE UP (ref 80–100)
MONOCYTES # BLD AUTO: 0.69 K/UL — SIGNIFICANT CHANGE UP (ref 0–0.9)
MONOCYTES NFR BLD AUTO: 10.4 % — SIGNIFICANT CHANGE UP (ref 2–14)
NEUTROPHILS # BLD AUTO: 2.83 K/UL — SIGNIFICANT CHANGE UP (ref 1.8–7.4)
NEUTROPHILS NFR BLD AUTO: 42.7 % — LOW (ref 43–77)
NITRITE UR-MCNC: NEGATIVE — SIGNIFICANT CHANGE UP
NRBC BLD AUTO-RTO: 0 /100 WBCS — SIGNIFICANT CHANGE UP (ref 0–0)
PH UR: 6 — SIGNIFICANT CHANGE UP (ref 5–8)
PLATELET # BLD AUTO: 268 K/UL — SIGNIFICANT CHANGE UP (ref 150–400)
POTASSIUM SERPL-MCNC: 4 MMOL/L — SIGNIFICANT CHANGE UP (ref 3.5–5.3)
POTASSIUM SERPL-SCNC: 4 MMOL/L — SIGNIFICANT CHANGE UP (ref 3.5–5.3)
PROT SERPL-MCNC: 7.4 G/DL — SIGNIFICANT CHANGE UP (ref 6–8.3)
PROT UR-MCNC: NEGATIVE MG/DL — SIGNIFICANT CHANGE UP
RBC # BLD: 4.68 M/UL — SIGNIFICANT CHANGE UP (ref 3.8–5.2)
RBC # FLD: 13.8 % — SIGNIFICANT CHANGE UP (ref 10.3–14.5)
RBC CASTS # UR COMP ASSIST: 12 /HPF — HIGH (ref 0–4)
SODIUM SERPL-SCNC: 142 MMOL/L — SIGNIFICANT CHANGE UP (ref 135–145)
SP GR SPEC: 1.02 — SIGNIFICANT CHANGE UP (ref 1–1.03)
UROBILINOGEN FLD QL: 1 MG/DL — SIGNIFICANT CHANGE UP (ref 0.2–1)
WBC # BLD: 6.62 K/UL — SIGNIFICANT CHANGE UP (ref 3.8–10.5)
WBC # FLD AUTO: 6.62 K/UL — SIGNIFICANT CHANGE UP (ref 3.8–10.5)
WBC UR QL: 2 /HPF — SIGNIFICANT CHANGE UP (ref 0–5)

## 2025-03-29 PROCEDURE — 99285 EMERGENCY DEPT VISIT HI MDM: CPT

## 2025-03-29 RX ORDER — KETOROLAC TROMETHAMINE 30 MG/ML
15 INJECTION, SOLUTION INTRAMUSCULAR; INTRAVENOUS ONCE
Refills: 0 | Status: DISCONTINUED | OUTPATIENT
Start: 2025-03-29 | End: 2025-03-29

## 2025-03-29 RX ADMIN — Medication 1000 MILLILITER(S): at 22:50

## 2025-03-29 RX ADMIN — KETOROLAC TROMETHAMINE 15 MILLIGRAM(S): 30 INJECTION, SOLUTION INTRAMUSCULAR; INTRAVENOUS at 22:51

## 2025-03-29 NOTE — ED PROVIDER NOTE - PATIENT PORTAL LINK FT
You can access the FollowMyHealth Patient Portal offered by [facility] by registering at the following website: [email or web address]. By joining [facility]’s FollowMyHealth portal, you will also be able to view your health information using other applications (apps) compatible with our system.

## 2025-03-29 NOTE — ED PROVIDER NOTE - OBJECTIVE STATEMENT
59-year-old female history of  and hypertension presents with 3 days of left lower quadrant pain and swelling.  Denies any vomiting or diarrhea.  Notes slight increased urinary frequency but no dysuria.

## 2025-03-29 NOTE — ED PROVIDER NOTE - PHYSICAL EXAMINATION
Left lower quadrant tenderness palpation abdomen is soft no flank tenderness palpation no rash.  Awake alert nonfocal neuroexam

## 2025-03-29 NOTE — ED ADULT NURSE NOTE - SUICIDE SCREENING QUESTION 3
years  Her last diagnostic work-up was an MRI lumbar spine in 2015  In past she had different interventional procedure for her chronic back and lumbar radicular symptoms including caudal epidural injection and lumbar radiofrequency ablation  Last procedure was right-sided lumbar RFA in November 2018  She had good relief but complained of intraprocedural pain     She is also on a medication contract with our clinic currently taking oxycodone 5 mg twice a day  Denies any side effect from the medication  Find the medication helpful  Recently she has got obtain a marijuana card and fell to [facility] for marijuana  Today we are discussing the use of marijuana and oxycodone together  Pain is progressively worsened over last several months  And paraspinal epidural effective and is requesting repeat epidural injection     RX Monitoring 6/18/2019   Attestation -   Periodic Controlled Substance Monitoring Possible medication side effects, risk of tolerance/dependence & alternative treatments discussed. Chronic Pain > 50 MEDD Obtained or confirmed \"Consent for Opioid Use\" on file. Current Pain Assessment  Pain Assessment  Pain Level: 6  Pain Type: Chronic pain  Pain Location: Back, Leg, Neck, Shoulder  Pain Orientation: Right  Pain Descriptors: Aching, Burning, Dull, Sharp, Stabbing, Shooting, Tingling, Numbness  Pain Frequency: Continuous         Associated Symptoms  1 Associated symptoms: weakness  2. Red Flags:               Chills No              Weight Loss :No              Loss of Bladder Control :No              Loss of BowelControl: No  3. BMI 27.5        Previous management history:  1. Previous diagnostic workup: xray 9/2018  2. Previous non interventionaltreatments tried:                  Physical Therapy:Yes 2016               Chiropractic Treatments: 2018   3. Previous Medications tried:  - NSAID's: Yes  - Neurontin: Yes currently  -Lyrica :No  - Trycyclic antidepressant:(Ellavil / Pamelor):  No   - No

## 2025-03-29 NOTE — ED PROVIDER NOTE - CLINICAL SUMMARY MEDICAL DECISION MAKING FREE TEXT BOX
patient left lower abdominal pain will do CAT scan pain control labs reassess.  Differential includes diverticulitis colitis.

## 2025-03-30 VITALS
TEMPERATURE: 98 F | HEART RATE: 61 BPM | RESPIRATION RATE: 18 BRPM | DIASTOLIC BLOOD PRESSURE: 69 MMHG | OXYGEN SATURATION: 97 % | SYSTOLIC BLOOD PRESSURE: 139 MMHG

## 2025-03-30 LAB — CULTURE RESULTS: SIGNIFICANT CHANGE UP

## 2025-03-30 PROCEDURE — 83690 ASSAY OF LIPASE: CPT

## 2025-03-30 PROCEDURE — 74177 CT ABD & PELVIS W/CONTRAST: CPT | Mod: MC

## 2025-03-30 PROCEDURE — 87086 URINE CULTURE/COLONY COUNT: CPT

## 2025-03-30 PROCEDURE — 36415 COLL VENOUS BLD VENIPUNCTURE: CPT

## 2025-03-30 PROCEDURE — 80053 COMPREHEN METABOLIC PANEL: CPT

## 2025-03-30 PROCEDURE — 96374 THER/PROPH/DIAG INJ IV PUSH: CPT | Mod: XU

## 2025-03-30 PROCEDURE — 84702 CHORIONIC GONADOTROPIN TEST: CPT

## 2025-03-30 PROCEDURE — 99284 EMERGENCY DEPT VISIT MOD MDM: CPT | Mod: 25

## 2025-03-30 PROCEDURE — 81001 URINALYSIS AUTO W/SCOPE: CPT

## 2025-03-30 PROCEDURE — 96375 TX/PRO/DX INJ NEW DRUG ADDON: CPT

## 2025-03-30 PROCEDURE — 85025 COMPLETE CBC W/AUTO DIFF WBC: CPT

## 2025-03-30 PROCEDURE — 74177 CT ABD & PELVIS W/CONTRAST: CPT | Mod: 26

## 2025-03-30 RX ORDER — LOSARTAN POTASSIUM 100 MG/1
1 TABLET, FILM COATED ORAL
Qty: 30 | Refills: 0
Start: 2025-03-30 | End: 2025-04-13

## 2025-03-30 RX ORDER — NAPROXEN SODIUM 275 MG
1 TABLET ORAL
Qty: 20 | Refills: 0
Start: 2025-03-30 | End: 2025-04-08

## 2025-03-30 RX ORDER — LOSARTAN POTASSIUM 100 MG/1
100 TABLET, FILM COATED ORAL DAILY
Refills: 0 | Status: DISCONTINUED | OUTPATIENT
Start: 2025-03-30 | End: 2025-04-02

## 2025-03-30 RX ADMIN — Medication 4 MILLIGRAM(S): at 01:28
